# Patient Record
Sex: MALE | Race: WHITE | HISPANIC OR LATINO | Employment: FULL TIME | URBAN - NONMETROPOLITAN AREA
[De-identification: names, ages, dates, MRNs, and addresses within clinical notes are randomized per-mention and may not be internally consistent; named-entity substitution may affect disease eponyms.]

---

## 2017-02-12 ENCOUNTER — OFFICE VISIT (OUTPATIENT)
Dept: URGENT CARE | Facility: PHYSICIAN GROUP | Age: 42
End: 2017-02-12
Payer: COMMERCIAL

## 2017-02-12 VITALS
DIASTOLIC BLOOD PRESSURE: 92 MMHG | WEIGHT: 165 LBS | SYSTOLIC BLOOD PRESSURE: 138 MMHG | TEMPERATURE: 97 F | HEART RATE: 92 BPM | RESPIRATION RATE: 16 BRPM | OXYGEN SATURATION: 97 %

## 2017-02-12 DIAGNOSIS — J20.9 ACUTE BRONCHITIS, UNSPECIFIED ORGANISM: ICD-10-CM

## 2017-02-12 PROCEDURE — 94640 AIRWAY INHALATION TREATMENT: CPT | Performed by: NURSE PRACTITIONER

## 2017-02-12 PROCEDURE — 99202 OFFICE O/P NEW SF 15 MIN: CPT | Mod: 25 | Performed by: NURSE PRACTITIONER

## 2017-02-12 RX ORDER — CODEINE PHOSPHATE AND GUAIFENESIN 10; 100 MG/5ML; MG/5ML
5-10 SOLUTION ORAL EVERY 6 HOURS PRN
Qty: 90 ML | Refills: 0 | Status: SHIPPED | OUTPATIENT
Start: 2017-02-12

## 2017-02-12 RX ORDER — AZITHROMYCIN 250 MG/1
TABLET, FILM COATED ORAL
Qty: 6 TAB | Refills: 0 | Status: SHIPPED | OUTPATIENT
Start: 2017-02-12

## 2017-02-12 RX ORDER — ALBUTEROL SULFATE 2.5 MG/3ML
2.5 SOLUTION RESPIRATORY (INHALATION) ONCE
Status: COMPLETED | OUTPATIENT
Start: 2017-02-12 | End: 2017-02-12

## 2017-02-12 RX ADMIN — ALBUTEROL SULFATE 2.5 MG: 2.5 SOLUTION RESPIRATORY (INHALATION) at 09:41

## 2017-02-12 ASSESSMENT — ENCOUNTER SYMPTOMS
VOMITING: 0
DIARRHEA: 0
RHINORRHEA: 1
NAUSEA: 0
CHILLS: 0
FEVER: 0
WEAKNESS: 1
MYALGIAS: 0
SORE THROAT: 1
SHORTNESS OF BREATH: 1
SPUTUM PRODUCTION: 1
COUGH: 1

## 2017-02-12 ASSESSMENT — COPD QUESTIONNAIRES: COPD: 0

## 2017-02-12 NOTE — MR AVS SNAPSHOT
Louie Mattsonz   2017 9:10 AM   Office Visit   MRN: [id number]    Department:  [facility]   Dept Phone:  [phone]    Description:  Male : 1975   Provider:  FABIAN Olmedo           Reason for Visit     Cough           Allergies as of 2017     No Known Allergies      You were diagnosed with     Acute bronchitis, unspecified organism   [[id number]]         Vital Signs     Blood Pressure Pulse Temperature Respirations Weight Oxygen Saturation    138/92 mmHg 92 36.1 °C (97 °F) 16 74.844 kg (165 lb) 97%    Smoking Status                   Never Smoker            Basic Information     Date Of Birth Sex Race Ethnicity Preferred Language    1975 Male White  Origin (Albanian,[place]er,Nauruan,Emirati, etc) English      Health Maintenance     Patient has no pending health maintenance at this time      Current Immunizations     No immunizations on file.      Below and/or attached are the medications your provider expects you to take. Review all of your home medications and newly ordered medications with your provider and/or pharmacist. Follow medication instructions as directed by your provider and/or pharmacist. Please keep your medication list with you and share with your provider. Update the information when medications are discontinued, doses are changed, or new medications (including over-the-counter products) are added; and carry medication information at all times in the event of emergency situations     Allergies:  No Known Allergies          Medications  Valid as of: 2017 -  9:55 AM    Generic Name Brand Name Tablet Size Instructions for use    Azithromycin (Tab) ZITHROMAX 250 MG Take as directed        Guaifenesin-Codeine (Solution) ROBITUSSIN -10 mg/5mL Take 5-10 mL by mouth every 6 hours as needed for Cough.        .                 Medicines prescribed today were sent to:     [facility], [address]  [facility] [address]  [facility] [address] [place]    [address] [place] [address]    Phone: [phone] Fax: [phone]    Open 24 Hours?: No      Medication refill instructions:       If your prescription bottle indicates you have medication refills left, it is not necessary to call your provider’s office. Please contact your pharmacy and they will refill your medication.    If your prescription bottle indicates you do not have any refills left, you may request refills at any time through one of the following ways: The online TrustDegrees system (except Urgent Care), by calling your provider’s office, or by asking your pharmacy to contact your provider’s office with a refill request. Medication refills are processed only during regular business hours and may not be available until the next business day. Your provider may request additional information or to have a follow-up visit with you prior to refilling your medication.   *Please Note: Medication refills are assigned a new Rx number when refilled electronically. Your pharmacy may indicate that no refills were authorized even though a new prescription for the same medication is available at the pharmacy. Please request the medicine by name with the pharmacy before contacting your provider for a refill.        Instructions    Acute Bronchitis  Bronchitis is inflammation of the airways that extend from the windpipe into the lungs (bronchi). The inflammation often causes mucus to develop. This leads to a cough, which is the most common symptom of bronchitis.   In acute bronchitis, the condition usually develops suddenly and goes away over time, usually in a couple weeks. Smoking, allergies, and asthma can make bronchitis worse. Repeated episodes of bronchitis may cause further lung problems.   CAUSES  Acute bronchitis is most often caused by the same virus that causes a cold. The virus can spread from person to person (contagious) through coughing, sneezing, and touching  contaminated objects.  SIGNS AND SYMPTOMS   · Cough.    · Fever.    · Coughing up mucus.    · Body aches.    · Chest congestion.    · Chills.    · Shortness of breath.    · Sore throat.    DIAGNOSIS   Acute bronchitis is usually diagnosed through a physical exam. Your health care provider will also ask you questions about your medical history. Tests, such as chest X-rays, are sometimes done to rule out other conditions.   TREATMENT   Acute bronchitis usually goes away in a couple weeks. Oftentimes, no medical treatment is necessary. Medicines are sometimes given for relief of fever or cough. Antibiotic medicines are usually not needed but may be prescribed in certain situations. In some cases, an inhaler may be recommended to help reduce shortness of breath and control the cough. A cool mist vaporizer may also be used to help thin bronchial secretions and make it easier to clear the chest.   HOME CARE INSTRUCTIONS  · Get plenty of rest.    · Drink enough fluids to keep your urine clear or pale yellow (unless you have a medical condition that requires fluid restriction). Increasing fluids may help thin your respiratory secretions (sputum) and reduce chest congestion, and it will prevent dehydration.    · Take medicines only as directed by your health care provider.  · If you were prescribed an antibiotic medicine, finish it all even if you start to feel better.  · Avoid smoking and secondhand smoke. Exposure to cigarette smoke or irritating chemicals will make bronchitis worse. If you are a smoker, consider using nicotine gum or skin patches to help control withdrawal symptoms. Quitting smoking will help your lungs heal faster.    · Reduce the chances of another bout of acute bronchitis by washing your hands frequently, avoiding people with cold symptoms, and trying not to touch your hands to your mouth, nose, or eyes.    · Keep all follow-up visits as directed by your health care provider.    SEEK MEDICAL CARE  IF:  Your symptoms do not improve after 1 week of treatment.   SEEK IMMEDIATE MEDICAL CARE IF:  · You develop an increased fever or chills.    · You have chest pain.    · You have severe shortness of breath.  · You have bloody sputum.    · You develop dehydration.  · You faint or repeatedly feel like you are going to pass out.  · You develop repeated vomiting.  · You develop a severe headache.  MAKE SURE YOU:   · Understand these instructions.  · Will watch your condition.  · Will get help right away if you are not doing well or get worse.     This information is not intended to replace advice given to you by your health care provider. Make sure you discuss any questions you have with your health care provider.     Document Released: 01/25/2006 Document Revised: 01/08/2016 Document Reviewed: 06/10/2014  [employer] Interactive Patient Education ©2016 Elsevier Inc.            Conductor Access Code: [email or web address]  Expires: 3/14/2017  9:55 AM    Your email address is not on file at [employer].  Email Addresses are required for you to sign up for Conductor, please contact [phone] to verify your personal information and to provide your email address prior to attempting to register for Conductor.    Louie Baca  [address] [place] [address]    Conductor  A secure, online tool to manage your health information     [employer]’s Conductor® is a secure, online tool that connects you to your personalized health information from the privacy of your home -- day or night - making it very easy for you to manage your healthcare. Once the activation process is completed, you can even access your medical information using the Conductor holden, which is available for free in the Apple Holden store or Google Play store.     To learn more about Conductor, visit [email or web address]    There are two levels of access available (as shown below):   My Chart Features  [facility] Primary Care Doctor [facility]  Specialists  [facility]  Urgent  Care Non-[facility] Primary Care Doctor   Email your healthcare team securely and privately 24/7 X X X    Manage appointments: schedule your next appointment; view details of past/upcoming appointments X      Request prescription refills. X      View recent personal medical records, including lab and immunizations X X X X   View health record, including health history, allergies, medications X X X X   Read reports about your outpatient visits, procedures, consult and ER notes X X X X   See your discharge summary, which is a recap of your hospital and/or ER visit that includes your diagnosis, lab results, and care plan X X  X     How to register for Luristic:  Once your e-mail address has been verified, follow the following steps to sign up for Luristic.     1. Go to  [email or web address]  2. Click on the Sign Up Now box, which takes you to the New Member Sign Up page. You will need to provide the following information:  a. Enter your Luristic Access Code exactly as it appears at the top of this page. (You will not need to use this code after you’ve completed the sign-up process. If you do not sign up before the expiration date, you must request a new code.)   b. Enter your date of birth.   c. Enter your home email address.   d. Click Submit, and follow the next screen’s instructions.  3. Create a Luristic ID. This will be your Luristic login ID and cannot be changed, so think of one that is secure and easy to remember.  4. Create a Luristic password. You can change your password at any time.  5. Enter your Password Reset Question and Answer. This can be used at a later time if you forget your password.   6. Enter your e-mail address. This allows you to receive e-mail notifications when new information is available in Luristic.  7. Click Sign Up. You can now view your health information.    For assistance activating your Luristic account, call [phone]

## 2017-02-12 NOTE — PATIENT INSTRUCTIONS
Acute Bronchitis  Bronchitis is inflammation of the airways that extend from the windpipe into the lungs (bronchi). The inflammation often causes mucus to develop. This leads to a cough, which is the most common symptom of bronchitis.   In acute bronchitis, the condition usually develops suddenly and goes away over time, usually in a couple weeks. Smoking, allergies, and asthma can make bronchitis worse. Repeated episodes of bronchitis may cause further lung problems.   CAUSES  Acute bronchitis is most often caused by the same virus that causes a cold. The virus can spread from person to person (contagious) through coughing, sneezing, and touching contaminated objects.  SIGNS AND SYMPTOMS   · Cough.    · Fever.    · Coughing up mucus.    · Body aches.    · Chest congestion.    · Chills.    · Shortness of breath.    · Sore throat.    DIAGNOSIS   Acute bronchitis is usually diagnosed through a physical exam. Your health care provider will also ask you questions about your medical history. Tests, such as chest X-rays, are sometimes done to rule out other conditions.   TREATMENT   Acute bronchitis usually goes away in a couple weeks. Oftentimes, no medical treatment is necessary. Medicines are sometimes given for relief of fever or cough. Antibiotic medicines are usually not needed but may be prescribed in certain situations. In some cases, an inhaler may be recommended to help reduce shortness of breath and control the cough. A cool mist vaporizer may also be used to help thin bronchial secretions and make it easier to clear the chest.   HOME CARE INSTRUCTIONS  · Get plenty of rest.    · Drink enough fluids to keep your urine clear or pale yellow (unless you have a medical condition that requires fluid restriction). Increasing fluids may help thin your respiratory secretions (sputum) and reduce chest congestion, and it will prevent dehydration.    · Take medicines only as directed by your health care provider.  · If  you were prescribed an antibiotic medicine, finish it all even if you start to feel better.  · Avoid smoking and secondhand smoke. Exposure to cigarette smoke or irritating chemicals will make bronchitis worse. If you are a smoker, consider using nicotine gum or skin patches to help control withdrawal symptoms. Quitting smoking will help your lungs heal faster.    · Reduce the chances of another bout of acute bronchitis by washing your hands frequently, avoiding people with cold symptoms, and trying not to touch your hands to your mouth, nose, or eyes.    · Keep all follow-up visits as directed by your health care provider.    SEEK MEDICAL CARE IF:  Your symptoms do not improve after 1 week of treatment.   SEEK IMMEDIATE MEDICAL CARE IF:  · You develop an increased fever or chills.    · You have chest pain.    · You have severe shortness of breath.  · You have bloody sputum.    · You develop dehydration.  · You faint or repeatedly feel like you are going to pass out.  · You develop repeated vomiting.  · You develop a severe headache.  MAKE SURE YOU:   · Understand these instructions.  · Will watch your condition.  · Will get help right away if you are not doing well or get worse.     This information is not intended to replace advice given to you by your health care provider. Make sure you discuss any questions you have with your health care provider.     Document Released: 01/25/2006 Document Revised: 01/08/2016 Document Reviewed: 06/10/2014  [employer] Interactive Patient Education ©2016 [employer]

## 2017-02-12 NOTE — PROGRESS NOTES
Subjective:      Louie Baca is a 41 y.o. male who presents with Cough            HPI Comments: Medications, Allergies and Prior Medical Hx reviewed and updated in [facility].with patient/family today         Cough  This is a new problem. The current episode started in the past 7 days. The problem has been gradually worsening. The cough is productive of sputum. Associated symptoms include chest pain (with cough and deep breaths), nasal congestion, rhinorrhea, a sore throat and shortness of breath. Pertinent negatives include no chills, ear congestion, ear pain, fever or myalgias. Nothing aggravates the symptoms. He has tried OTC cough suppressant for the symptoms. The treatment provided mild relief. There is no history of asthma, COPD or emphysema.       Review of Systems   Constitutional: Positive for malaise/fatigue. Negative for fever and chills.   HENT: Positive for congestion, rhinorrhea and sore throat. Negative for ear discharge and ear pain.    Respiratory: Positive for cough, sputum production and shortness of breath.    Cardiovascular: Positive for chest pain (with cough and deep breaths).   Gastrointestinal: Negative for nausea, vomiting and diarrhea.   Musculoskeletal: Negative for myalgias.   Neurological: Positive for weakness.          Objective:     /92 mmHg  Pulse 92  Temp(Src) 36.1 °C (97 °F)  Resp 16  Wt 74.844 kg (165 lb)  SpO2 97%     Physical Exam   Constitutional: He appears well-developed and well-nourished. No distress.   HENT:   Head: Normocephalic and atraumatic.   Right Ear: Tympanic membrane and ear canal normal.   Left Ear: Tympanic membrane and ear canal normal.   Nose: Rhinorrhea present.   Mouth/Throat: Uvula is midline and mucous membranes are normal. Posterior oropharyngeal edema and posterior oropharyngeal erythema present. No oropharyngeal exudate.   Eyes: Conjunctivae are normal. Pupils are equal, round, and reactive to light.   Neck: Neck supple.   Cardiovascular: Normal  rate, regular rhythm and normal heart sounds.    Pulmonary/Chest: Effort normal and breath sounds normal. No respiratory distress. He has no wheezes.   Lymphadenopathy:     He has cervical adenopathy.   Neurological: He is alert.   Skin: Skin is warm and dry.   Psychiatric: He has a normal mood and affect. His behavior is normal.   Vitals reviewed.              Assessment/Plan:     1. Acute bronchitis, unspecified organism  albuterol (PROVENTIL) 2.5mg/3ml nebulizer solution 2.5 mg    azithromycin (ZITHROMAX) 250 MG Tab    guaifenesin-codeine (CHERATUSSIN AC) Solution oral solution         Pt was given an albuterol nebulized treatment in clinic, which he states did not seem to help his breathing    Do not drink alcohol or operate machinery with this medication  Pt reviewed on [place]  Aware,  no remarkable controlled substance prescription documentation noted      Rest, Fluids, tylenol, ibuprofen, humidified air, and otc cough meds  Pt will go to the ER for worsening or changing symptoms as discussed, follow-up with your primary care provider or return here if not improving in 7 days   Discharge instructions discussed with pt/family who verbalize understanding and agreement with poc

## 2017-02-13 ENCOUNTER — HOSPITAL ENCOUNTER (EMERGENCY)
Facility: MEDICAL CENTER | Age: 42
End: 2017-02-13
Payer: COMMERCIAL

## 2017-02-13 VITALS
HEIGHT: 66 IN | TEMPERATURE: 99 F | OXYGEN SATURATION: 92 % | HEART RATE: 96 BPM | BODY MASS INDEX: 26.61 KG/M2 | RESPIRATION RATE: 18 BRPM | DIASTOLIC BLOOD PRESSURE: 93 MMHG | SYSTOLIC BLOOD PRESSURE: 141 MMHG | WEIGHT: 165.57 LBS

## 2017-02-13 PROCEDURE — 302449 STATCHG TRIAGE ONLY (STATISTIC)

## 2017-02-13 ASSESSMENT — PAIN SCALES - GENERAL: PAINLEVEL_OUTOF10: 10

## 2017-02-13 NOTE — ED NOTES
"Louie Baca 41 y.o. male ambulatory to triage for     Chief Complaint   Patient presents with   • Chest Pain     pt reports he woke up yesterday with chest pains.  Pt went to urgent care in [place] and was given an inhaler and antibiotics.  Pt reports pain has not resolved.  Pt reports pain \"heavy and squeezing like someone is pushing against your chest with a hammer\".     • Shortness of Breath     pt feels he cannot take in a deep breath due to heavy squeezing chest     Pt reports he had cold symptoms last week with fever, cough and congestion.  Pt reports cold symptoms resolved.  /93 mmHg  Pulse 96  Temp(Src) 37.2 °C (99 °F) (Temporal)  Resp 18  Ht 1.676 m (5' 6\")  Wt 75.1 kg (165 lb 9.1 oz)  BMI 26.74 kg/m2  SpO2 92%  Pt asked to return to the lobby to await bed assignment.  Advised to return to the triage desk for any changes/concerns.  "

## 2017-02-15 ENCOUNTER — OFFICE VISIT (OUTPATIENT)
Dept: URGENT CARE | Facility: PHYSICIAN GROUP | Age: 42
End: 2017-02-15
Payer: COMMERCIAL

## 2017-02-15 ENCOUNTER — APPOINTMENT (OUTPATIENT)
Dept: RADIOLOGY | Facility: IMAGING CENTER | Age: 42
End: 2017-02-15
Attending: PHYSICIAN ASSISTANT
Payer: COMMERCIAL

## 2017-02-15 VITALS
OXYGEN SATURATION: 96 % | SYSTOLIC BLOOD PRESSURE: 132 MMHG | TEMPERATURE: 98.2 F | HEART RATE: 96 BPM | DIASTOLIC BLOOD PRESSURE: 88 MMHG | RESPIRATION RATE: 14 BRPM | BODY MASS INDEX: 27.13 KG/M2 | WEIGHT: 168 LBS

## 2017-02-15 DIAGNOSIS — R05.9 COUGH: ICD-10-CM

## 2017-02-15 DIAGNOSIS — R06.2 WHEEZE: ICD-10-CM

## 2017-02-15 DIAGNOSIS — J18.9 PNEUMONIA OF RIGHT UPPER LOBE DUE TO INFECTIOUS ORGANISM: ICD-10-CM

## 2017-02-15 DIAGNOSIS — J20.9 ACUTE BRONCHITIS, UNSPECIFIED ORGANISM: ICD-10-CM

## 2017-02-15 PROCEDURE — 71020 DX-CHEST-2 VIEWS: CPT | Mod: TC | Performed by: PHYSICIAN ASSISTANT

## 2017-02-15 PROCEDURE — 94640 AIRWAY INHALATION TREATMENT: CPT | Performed by: PHYSICIAN ASSISTANT

## 2017-02-15 PROCEDURE — 99214 OFFICE O/P EST MOD 30 MIN: CPT | Mod: 25 | Performed by: PHYSICIAN ASSISTANT

## 2017-02-15 RX ORDER — ALBUTEROL SULFATE 2.5 MG/3ML
2.5 SOLUTION RESPIRATORY (INHALATION) ONCE
Status: COMPLETED | OUTPATIENT
Start: 2017-02-15 | End: 2017-02-15

## 2017-02-15 RX ORDER — CLARITHROMYCIN 500 MG/1
500 TABLET, COATED ORAL 2 TIMES DAILY
Qty: 20 TAB | Refills: 0 | Status: SHIPPED | OUTPATIENT
Start: 2017-02-15

## 2017-02-15 RX ORDER — METHYLPREDNISOLONE 4 MG/1
4 TABLET ORAL SEE ADMIN INSTRUCTIONS
Qty: 21 TAB | Refills: 0 | Status: SHIPPED | OUTPATIENT
Start: 2017-02-15

## 2017-02-15 RX ORDER — ALBUTEROL SULFATE 90 UG/1
2 AEROSOL, METERED RESPIRATORY (INHALATION) EVERY 4 HOURS PRN
Qty: 1 INHALER | Refills: 0 | Status: SHIPPED | OUTPATIENT
Start: 2017-02-15

## 2017-02-15 RX ADMIN — ALBUTEROL SULFATE 2.5 MG: 2.5 SOLUTION RESPIRATORY (INHALATION) at 17:13

## 2017-02-15 NOTE — MR AVS SNAPSHOT
Louie Baca   2/15/2017 4:35 PM   Office Visit   MRN: [id number]    Department:  [facility]   Dept Phone:  [phone]    Description:  Male : 1975   Provider:  Jesus Francis PA-C           Reason for Visit     Cough           Allergies as of 2/15/2017     No Known Allergies      You were diagnosed with     Acute bronchitis, unspecified organism   [[id number]]       Cough   [786.2.ICD-9-CM]       Wheeze   [[id number]]       Pneumonia of right upper lobe due to infectious organism   [[id number]]         Vital Signs     Blood Pressure Pulse Temperature Respirations Weight Oxygen Saturation    132/88 mmHg 96 36.8 °C (98.2 °F) 14 76.204 kg (168 lb) 96%    Smoking Status                   Never Smoker            Basic Information     Date Of Birth Sex Race Ethnicity Preferred Language    1975 Male White  Origin (Nepali,Citizen of [place],Haitian,Georgian, etc) English      Health Maintenance        Date Due Completion Dates    IMM DTaP/Tdap/Td Vaccine (1 - Tdap) 1992 ---    IMM INFLUENZA (1) 2016 ---            Current Immunizations     No immunizations on file.      Below and/or attached are the medications your provider expects you to take. Review all of your home medications and newly ordered medications with your provider and/or pharmacist. Follow medication instructions as directed by your provider and/or pharmacist. Please keep your medication list with you and share with your provider. Update the information when medications are discontinued, doses are changed, or new medications (including over-the-counter products) are added; and carry medication information at all times in the event of emergency situations     Allergies:  No Known Allergies          Medications  Valid as of: February 15, 2017 -  6:03 PM    Generic Name Brand Name Tablet Size Instructions for use    Albuterol Sulfate (Aero Soln) albuterol 108 (90 BASE) MCG/ACT Inhale 2 Puffs by mouth every four hours as needed.         Azithromycin (Tab) ZITHROMAX 250 MG Take as directed        Clarithromycin (Tab) BIAXIN 500 MG Take 1 Tab by mouth 2 times a day.        Guaifenesin-Codeine (Solution) ROBITUSSIN -10 mg/5mL Take 5-10 mL by mouth every 6 hours as needed for Cough.        MethylPREDNISolone (Tablet Therapy Pack) MEDROL DOSEPAK 4 MG Take 1 Tab by mouth See Admin Instructions.        .                 Medicines prescribed today were sent to:     [facility] - [place], [place] - [address] [facility] [address] [facility] [address] [place]    [address] [facility] [address] [place] [address]    Phone: [phone] Fax: [phone]    Open 24 Hours?: No      Medication refill instructions:       If your prescription bottle indicates you have medication refills left, it is not necessary to call your provider’s office. Please contact your pharmacy and they will refill your medication.    If your prescription bottle indicates you do not have any refills left, you may request refills at any time through one of the following ways: The online Re-Com[employer] system (except Urgent Care), by calling your provider’s office, or by asking your pharmacy to contact your provider’s office with a refill request. Medication refills are processed only during regular business hours and may not be available until the next business day. Your provider may request additional information or to have a follow-up visit with you prior to refilling your medication.   *Please Note: Medication refills are assigned a new Rx number when refilled electronically. Your pharmacy may indicate that no refills were authorized even though a new prescription for the same medication is available at the pharmacy. Please request the medicine by name with the pharmacy before contacting your provider for a refill.        Your To Do List     Future Labs/Procedures Complete By Expires    DX-CHEST-2 VIEWS  As directed 2/15/2018         Re-[employer] Access Code: Activation code not  generated  Current MyChart Status: Active

## 2017-02-16 NOTE — PROGRESS NOTES
Chief Complaint   Patient presents with   • Cough       HISTORY OF PRESENT ILLNESS: Patient is a 41 y.o. male who presents today because he was seen 4 days ago with a cough. He was prescribed azithromycin and cough medication and is not better yet, so he came back in for evaluation. Denies any fevers, chills, nausea, vomiting or diarrhea. He has been coughing and wheezing and having green phlegm production.    There are no active problems to display for this patient.      Allergies:Review of patient's allergies indicates no known allergies.    Current Outpatient Prescriptions Ordered in [facility]   Medication Sig Dispense Refill   • albuterol 108 (90 BASE) MCG/ACT Aero Soln inhalation aerosol Inhale 2 Puffs by mouth every four hours as needed. 1 Inhaler 0   • MethylPREDNISolone (MEDROL DOSEPAK) 4 MG Tablet Therapy Pack Take 1 Tab by mouth See Admin Instructions. 21 Tab 0   • clarithromycin (BIAXIN) 500 MG Tab Take 1 Tab by mouth 2 times a day. 20 Tab 0   • azithromycin (ZITHROMAX) 250 MG Tab Take as directed 6 Tab 0   • guaifenesin-codeine (CHERATUSSIN AC) Solution oral solution Take 5-10 mL by mouth every 6 hours as needed for Cough. 90 mL 0     No current Epic-ordered facility-administered medications on file.       Past Medical History   Diagnosis Date   • Hypertension    • Diabetes (CMS-[facility])        Social History   Substance Use Topics   • Smoking status: Never Smoker    • Smokeless tobacco: None   • Alcohol Use: None       No family status information on file.   History reviewed. No pertinent family history.    ROS:  Review of Systems   Constitutional: Negative for fever, chills, weight loss and malaise/fatigue.   HENT: Negative for ear pain, nosebleeds, congestion, sore throat and neck pain.    Eyes: Negative for blurred vision.   Respiratory: Positive for cough, sputum production, shortness of breath and wheezing.    Cardiovascular: Negative for chest pain, palpitations, orthopnea and leg swelling.    Gastrointestinal: Negative for heartburn, nausea, vomiting and abdominal pain.   Genitourinary: Negative for dysuria, urgency and frequency.     Exam:  Blood pressure 132/88, pulse 96, temperature 36.8 °C (98.2 °F), resp. rate 14, weight 76.204 kg (168 lb), SpO2 96 %.  General:  Well nourished, well developed male in NAD  Head:Normocephalic, atraumatic  Eyes: PERRLA, EOM within normal limits, no conjunctival injection, no scleral icterus, visual fields and acuity grossly intact.  Ears: Normal shape and symmetry, no tenderness, no discharge. External canals are without any significant edema or erythema. Tympanic membranes are without any inflammation, no effusion. Gross auditory acuity is intact  Nose: Symmetrical without tenderness, no discharge.  Mouth: reasonable hygiene, no erythema exudates or tonsillar enlargement.  Neck: no masses, range of motion within normal limits, no tracheal deviation. No obvious thyroid enlargement.  Pulmonary: chest is symmetrical with respiration, diminished with scattered wheezes and rhonchi bilaterally, not clearing with cough. After nebulization of albuterol in the office, The patient stated subjective improvement, there is increased air exchange, scattered wheezing and rhonchi remains.  Cardiovascular: regular rate and rhythm without murmurs, rubs, or gallops.  Extremities: no clubbing, cyanosis, or edema.    Chest x-ray per radiology interpretation:  Patchy right midlung zone consolidation is compatible with pneumonia. Recommend follow-up to resolution      Please note that this dictation was created using voice recognition software. I have made every reasonable attempt to correct obvious errors, but I expect that there are errors of grammar and possibly content that I did not discover before finalizing the note.    Assessment/Plan:  1. Acute bronchitis, unspecified organism  MethylPREDNISolone (MEDROL DOSEPAK) 4 MG Tablet Therapy Pack   2. Cough  DX-CHEST-2 VIEWS   3. Wheeze   albuterol 108 (90 BASE) MCG/ACT Aero Soln inhalation aerosol    albuterol (PROVENTIL) 2.5mg/3ml nebulizer solution 2.5 mg   4. Pneumonia of right upper lobe due to infectious organism  clarithromycin (BIAXIN) 500 MG Tab       Followup with primary care in the next 7-10 days if not significantly improving, return to the urgent care or go to the emergency room sooner for any worsening of symptoms.